# Patient Record
Sex: FEMALE | Race: OTHER | URBAN - METROPOLITAN AREA
[De-identification: names, ages, dates, MRNs, and addresses within clinical notes are randomized per-mention and may not be internally consistent; named-entity substitution may affect disease eponyms.]

---

## 2017-03-05 ENCOUNTER — EMERGENCY (EMERGENCY)
Facility: HOSPITAL | Age: 42
LOS: 1 days | Discharge: HOME | End: 2017-03-05
Attending: EMERGENCY MEDICINE | Admitting: EMERGENCY MEDICINE
Payer: COMMERCIAL

## 2017-03-05 VITALS
RESPIRATION RATE: 16 BRPM | HEART RATE: 91 BPM | DIASTOLIC BLOOD PRESSURE: 64 MMHG | TEMPERATURE: 98 F | SYSTOLIC BLOOD PRESSURE: 103 MMHG | OXYGEN SATURATION: 98 %

## 2017-03-05 DIAGNOSIS — M25.572 PAIN IN LEFT ANKLE AND JOINTS OF LEFT FOOT: ICD-10-CM

## 2017-03-05 PROCEDURE — 73630 X-RAY EXAM OF FOOT: CPT | Mod: 26,LT

## 2017-03-05 PROCEDURE — 99284 EMERGENCY DEPT VISIT MOD MDM: CPT | Mod: 25

## 2017-03-05 PROCEDURE — 29515 APPLICATION SHORT LEG SPLINT: CPT | Mod: LT

## 2017-03-05 PROCEDURE — 73630 X-RAY EXAM OF FOOT: CPT

## 2017-03-05 PROCEDURE — 73610 X-RAY EXAM OF ANKLE: CPT | Mod: 26,LT

## 2017-03-05 PROCEDURE — 73610 X-RAY EXAM OF ANKLE: CPT

## 2017-03-05 RX ORDER — IBUPROFEN 200 MG
600 TABLET ORAL ONCE
Qty: 0 | Refills: 0 | Status: COMPLETED | OUTPATIENT
Start: 2017-03-05 | End: 2017-03-05

## 2017-03-05 RX ADMIN — Medication 600 MILLIGRAM(S): at 20:51

## 2017-03-05 NOTE — ED ADULT NURSE NOTE - CHPI ED SYMPTOMS NEG
no deformity/no back pain/no abrasion/no fever/no tingling/no numbness/no bruising/no stiffness/no weakness

## 2017-03-05 NOTE — ED PROVIDER NOTE - PLAN OF CARE
Rest, increase activity as tolerated. Wear splint at all times. Do not get wet, do not remove, Use crutches to walk. Rest, ice packs to all sore areas. Take motrin for pain as needed- 600 mg every 8 hrs. Take with food. Follow up with orthopedist. [phone]- DR Chaparro

## 2017-03-05 NOTE — ED PROVIDER NOTE - CARE PLAN
Principal Discharge DX:	Closed fracture of left ankle, initial encounter  Instructions for follow-up, activity and diet:	Rest, increase activity as tolerated. Wear splint at all times. Do not get wet, do not remove, Use crutches to walk. Rest, ice packs to all sore areas. Take motrin for pain as needed- 600 mg every 8 hrs. Take with food. Follow up with orthopedist. [phone]- DR Chaparro Principal Discharge DX:	Closed fracture of left ankle, initial encounter  Instructions for follow-up, activity and diet:	Rest, increase activity as tolerated. Wear splint at all times. Do not get wet, do not remove, Use crutches to walk. Rest, ice packs to all sore areas. Take motrin for pain as needed- 600 mg every 8 hrs. Take with food. Follow up with orthopedist. [phone]- DR Chaparro Principal Discharge DX:	Closed fracture of left ankle, initial encounter  Instructions for follow-up, activity and diet:	Rest, increase activity as tolerated. Wear splint at all times. Do not get wet, do not remove, Use crutches to walk. Rest, ice packs to all sore areas. Take motrin for pain as needed- 600 mg every 8 hrs. Take with food. Follow up with orthopedist. [phone]- DR Chaparro

## 2017-03-05 NOTE — ED PROCEDURE NOTE - CPROC ED POST PROC CARE GUIDE1
Verbal/written post procedure instructions were given to patient/caregiver./Instructed patient/caregiver regarding signs and symptoms of infection./Elevate the injured extremity as instructed./Instructed patient/caregiver to follow-up with primary care physician./Keep the cast/splint/dressing clean and dry.

## 2017-03-05 NOTE — ED PROVIDER NOTE - MEDICAL DECISION MAKING DETAILS
s/p left ankle inversion injury- distal fib fx. skin intact- splinted. , f/u with crutches Keshav: 41 to F here s/p left ankle inversion injury- possible distal fib fx. skin intact but will recommend that pt see orthopedic for repeat xray in 1 week to determine whether it is a true fracture- splinted as pt has pain with ambulation for ligamentous injury, f/u with crutches Keshav: 41 to F here s/p left ankle inversion injury- sprain vs possible distal fib fx. skin intact but will recommend that pt see orthopedic for repeat xray in 1 week to determine whether it is a true fracture- splinted as pt has pain with ambulation for ligamentous injury, f/u with crutches

## 2017-03-05 NOTE — ED ADULT NURSE NOTE - OBJECTIVE STATEMENT
42 y/o female presents to the ED c/o left ankle injury s/p mechanical fall at home. Pt states she was walking and slipped on a plastic bag, twisted ankle. Difficulty bearing weight and ambulating s/p injury. Presents with left ankle swelling, no ecchymosis, no redness. Pt A&Ox3, +sensation, no numbness/tingling, cap refill<2, resting comfortably with safety maintained.

## 2017-03-05 NOTE — ED PROCEDURE NOTE - CPROC ED POST RADIOGRAPHY1
extremity correctly positioned, splinted/post-procedure radiography performed/post procedure radiography not performed

## 2017-03-05 NOTE — ED PROVIDER NOTE - OBJECTIVE STATEMENT
42 yo  female presents to the ER for evaluation of left lateral ankle pain. Pt states "I slipped and rolled my left ankle this evening about one hour ago". Pt with moderate lateral left ankle pain and swelling. SKin intact,  Unable to ambulate due to pain. No pain to leg or knee.  Pt reports pain at left lateral foot.

## 2021-02-11 PROBLEM — Z00.00 ENCOUNTER FOR PREVENTIVE HEALTH EXAMINATION: Status: ACTIVE | Noted: 2021-02-11

## 2021-02-22 ENCOUNTER — APPOINTMENT (OUTPATIENT)
Dept: GASTROENTEROLOGY | Facility: CLINIC | Age: 46
End: 2021-02-22
Payer: COMMERCIAL

## 2021-02-22 VITALS
DIASTOLIC BLOOD PRESSURE: 78 MMHG | SYSTOLIC BLOOD PRESSURE: 110 MMHG | HEIGHT: 64 IN | BODY MASS INDEX: 28.68 KG/M2 | WEIGHT: 168 LBS

## 2021-02-22 DIAGNOSIS — Z80.0 FAMILY HISTORY OF MALIGNANT NEOPLASM OF DIGESTIVE ORGANS: ICD-10-CM

## 2021-02-22 DIAGNOSIS — U07.1 COVID-19: ICD-10-CM

## 2021-02-22 PROCEDURE — 99072 ADDL SUPL MATRL&STAF TM PHE: CPT

## 2021-02-22 PROCEDURE — 99244 OFF/OP CNSLTJ NEW/EST MOD 40: CPT

## 2021-02-22 RX ORDER — METHYLPREDNISOLONE 4 MG/1
4 TABLET ORAL
Qty: 21 | Refills: 0 | Status: DISCONTINUED | COMMUNITY
Start: 2020-09-03

## 2021-02-22 RX ORDER — CLONAZEPAM 0.5 MG/1
0.5 TABLET ORAL
Qty: 7 | Refills: 0 | Status: DISCONTINUED | COMMUNITY
Start: 2021-01-07

## 2021-02-22 RX ORDER — GABAPENTIN 300 MG/1
300 CAPSULE ORAL
Qty: 30 | Refills: 0 | Status: DISCONTINUED | COMMUNITY
Start: 2020-12-11

## 2021-04-13 ENCOUNTER — APPOINTMENT (OUTPATIENT)
Dept: GASTROENTEROLOGY | Facility: CLINIC | Age: 46
End: 2021-04-13

## 2021-05-24 ENCOUNTER — APPOINTMENT (OUTPATIENT)
Dept: CARDIOLOGY | Facility: CLINIC | Age: 46
End: 2021-05-24
Payer: COMMERCIAL

## 2021-05-24 ENCOUNTER — NON-APPOINTMENT (OUTPATIENT)
Age: 46
End: 2021-05-24

## 2021-05-24 VITALS
HEART RATE: 95 BPM | SYSTOLIC BLOOD PRESSURE: 104 MMHG | WEIGHT: 172 LBS | OXYGEN SATURATION: 98 % | BODY MASS INDEX: 29.52 KG/M2 | DIASTOLIC BLOOD PRESSURE: 73 MMHG

## 2021-05-24 PROCEDURE — 99205 OFFICE O/P NEW HI 60 MIN: CPT

## 2021-05-24 PROCEDURE — 93000 ELECTROCARDIOGRAM COMPLETE: CPT

## 2021-05-25 ENCOUNTER — NON-APPOINTMENT (OUTPATIENT)
Age: 46
End: 2021-05-25

## 2021-05-25 NOTE — CONSULT LETTER
[Dear  ___] : Dear  [unfilled], [Consult Letter:] : I had the pleasure of evaluating your patient, [unfilled]. [( Thank you for referring [unfilled] for consultation for _____ )] : Thank you for referring [unfilled] for consultation for [unfilled] [Please see my note below.] : Please see my note below. [Consult Closing:] : Thank you very much for allowing me to participate in the care of this patient.  If you have any questions, please do not hesitate to contact me. [Sincerely,] : Sincerely, [FreeTextEntry3] : August Castorena MD, FACP, AGAF, FAASLD\par  of Medicine\par [facility]\par

## 2021-05-25 NOTE — ASSESSMENT
[FreeTextEntry1] : 45-year-old female with epigastric pain history of Covid she experienced.  She experiences occasional palpitations for which she request cardiology evaluation.  She would like to wait for her endoscopies till she is seen by a cardiologist.  She already has an appointment and will make a future appointment.

## 2021-05-25 NOTE — PHYSICAL EXAM
[General Appearance - Alert] : alert [Sclera] : the sclera and conjunctiva were normal [Outer Ear] : the ears and nose were normal in appearance [Neck Appearance] : the appearance of the neck was normal [] : no respiratory distress [Exaggerated Use Of Accessory Muscles For Inspiration] : no accessory muscle use [Apical Impulse] : the apical impulse was normal [Bowel Sounds] : normal bowel sounds [Cranial Nerves] : cranial nerves 2-12 were intact [Oriented To Time, Place, And Person] : oriented to person, place, and time [FreeTextEntry1] : mild epigastric tenderness

## 2021-05-25 NOTE — HISTORY OF PRESENT ILLNESS
[FreeTextEntry1] : 44 yo female, with epigastric pain x 3months. Complains of nausea, pain worsened by ETOH. Uses asa prn.  She states she is recovered from Covid in the spring and would like to visit a cardiologist because of her palpitations prior to scheduling an endoscopy.  She has occasional constipation for which she uses [employer] milk of magnesia.

## 2021-05-25 NOTE — REVIEW OF SYSTEMS
[Abdominal Pain] : abdominal pain [Arthralgias] : arthralgias [Negative] : Endocrine [FreeTextEntry5] : palpitations

## 2022-01-25 ENCOUNTER — NON-APPOINTMENT (OUTPATIENT)
Age: 47
End: 2022-01-25

## 2022-01-25 ENCOUNTER — APPOINTMENT (OUTPATIENT)
Dept: CARDIOLOGY | Facility: CLINIC | Age: 47
End: 2022-01-25
Payer: COMMERCIAL

## 2022-01-25 VITALS
HEIGHT: 64 IN | BODY MASS INDEX: 28.51 KG/M2 | WEIGHT: 167 LBS | OXYGEN SATURATION: 97 % | RESPIRATION RATE: 17 BRPM | SYSTOLIC BLOOD PRESSURE: 110 MMHG | DIASTOLIC BLOOD PRESSURE: 76 MMHG | HEART RATE: 87 BPM

## 2022-01-25 PROCEDURE — 99214 OFFICE O/P EST MOD 30 MIN: CPT

## 2022-01-25 RX ORDER — OXYCODONE AND ACETAMINOPHEN 5; 325 MG/1; MG/1
5-325 TABLET ORAL
Qty: 25 | Refills: 0 | Status: COMPLETED | COMMUNITY
Start: 2021-08-12

## 2022-01-25 RX ORDER — CEPHALEXIN 500 MG/1
500 CAPSULE ORAL
Qty: 4 | Refills: 0 | Status: COMPLETED | COMMUNITY
Start: 2021-07-26

## 2022-01-25 RX ORDER — TRIAMCINOLONE ACETONIDE 5 MG/G
0.5 CREAM TOPICAL
Qty: 15 | Refills: 0 | Status: COMPLETED | COMMUNITY
Start: 2021-10-09

## 2022-01-25 RX ORDER — CELECOXIB 200 MG/1
200 CAPSULE ORAL
Qty: 60 | Refills: 0 | Status: COMPLETED | COMMUNITY
Start: 2021-09-08

## 2022-01-25 RX ORDER — KETOCONAZOLE 20.5 MG/ML
2 SHAMPOO, SUSPENSION TOPICAL
Qty: 240 | Refills: 0 | Status: COMPLETED | COMMUNITY
Start: 2021-11-19

## 2022-01-25 RX ORDER — ETODOLAC 500 MG/1
500 TABLET, FILM COATED ORAL
Qty: 60 | Refills: 0 | Status: COMPLETED | COMMUNITY
Start: 2021-12-01

## 2022-01-25 RX ORDER — HALOBETASOL PROPIONATE 0.5 MG/G
0.05 OINTMENT TOPICAL
Qty: 50 | Refills: 0 | Status: COMPLETED | COMMUNITY
Start: 2021-11-18

## 2022-01-25 RX ORDER — AMOXICILLIN 500 MG/1
500 CAPSULE ORAL
Qty: 21 | Refills: 0 | Status: COMPLETED | COMMUNITY
Start: 2022-01-17

## 2022-04-12 ENCOUNTER — APPOINTMENT (OUTPATIENT)
Dept: CARDIOLOGY | Facility: CLINIC | Age: 47
End: 2022-04-12
Payer: COMMERCIAL

## 2022-04-12 VITALS
HEART RATE: 82 BPM | HEIGHT: 64 IN | RESPIRATION RATE: 17 BRPM | WEIGHT: 162 LBS | BODY MASS INDEX: 27.66 KG/M2 | SYSTOLIC BLOOD PRESSURE: 113 MMHG | OXYGEN SATURATION: 99 % | DIASTOLIC BLOOD PRESSURE: 75 MMHG

## 2022-04-12 PROCEDURE — 99214 OFFICE O/P EST MOD 30 MIN: CPT

## 2022-04-14 ENCOUNTER — RX RENEWAL (OUTPATIENT)
Age: 47
End: 2022-04-14

## 2022-09-09 ENCOUNTER — APPOINTMENT (OUTPATIENT)
Dept: CARDIOLOGY | Facility: CLINIC | Age: 47
End: 2022-09-09

## 2023-01-28 ENCOUNTER — RX RENEWAL (OUTPATIENT)
Age: 48
End: 2023-01-28

## 2023-02-10 ENCOUNTER — NON-APPOINTMENT (OUTPATIENT)
Age: 48
End: 2023-02-10

## 2023-02-10 ENCOUNTER — APPOINTMENT (OUTPATIENT)
Dept: CARDIOLOGY | Facility: CLINIC | Age: 48
End: 2023-02-10
Payer: COMMERCIAL

## 2023-02-10 VITALS
HEIGHT: 64 IN | HEART RATE: 104 BPM | OXYGEN SATURATION: 95 % | SYSTOLIC BLOOD PRESSURE: 116 MMHG | BODY MASS INDEX: 28.68 KG/M2 | DIASTOLIC BLOOD PRESSURE: 76 MMHG | WEIGHT: 168 LBS

## 2023-02-10 PROCEDURE — 99214 OFFICE O/P EST MOD 30 MIN: CPT

## 2023-06-27 ENCOUNTER — APPOINTMENT (OUTPATIENT)
Dept: GASTROENTEROLOGY | Facility: CLINIC | Age: 48
End: 2023-06-27

## 2023-11-15 ENCOUNTER — APPOINTMENT (OUTPATIENT)
Dept: GASTROENTEROLOGY | Facility: CLINIC | Age: 48
End: 2023-11-15
Payer: COMMERCIAL

## 2023-11-15 VITALS
RESPIRATION RATE: 16 BRPM | BODY MASS INDEX: 27.31 KG/M2 | HEART RATE: 105 BPM | HEIGHT: 64 IN | WEIGHT: 160 LBS | OXYGEN SATURATION: 98 % | DIASTOLIC BLOOD PRESSURE: 70 MMHG | TEMPERATURE: 98.6 F | SYSTOLIC BLOOD PRESSURE: 112 MMHG

## 2023-11-15 DIAGNOSIS — Z12.11 ENCOUNTER FOR SCREENING FOR MALIGNANT NEOPLASM OF COLON: ICD-10-CM

## 2023-11-15 PROCEDURE — 99213 OFFICE O/P EST LOW 20 MIN: CPT

## 2023-11-15 RX ORDER — OMEPRAZOLE 40 MG/1
40 CAPSULE, DELAYED RELEASE ORAL
Qty: 90 | Refills: 2 | Status: ACTIVE | COMMUNITY
Start: 2021-02-22 | End: 1900-01-01

## 2024-01-08 ENCOUNTER — NON-APPOINTMENT (OUTPATIENT)
Age: 49
End: 2024-01-08

## 2024-01-08 ENCOUNTER — APPOINTMENT (OUTPATIENT)
Dept: CARDIOLOGY | Facility: CLINIC | Age: 49
End: 2024-01-08
Payer: COMMERCIAL

## 2024-01-08 VITALS
DIASTOLIC BLOOD PRESSURE: 80 MMHG | WEIGHT: 153 LBS | OXYGEN SATURATION: 98 % | HEART RATE: 91 BPM | BODY MASS INDEX: 26.12 KG/M2 | SYSTOLIC BLOOD PRESSURE: 120 MMHG | HEIGHT: 64 IN

## 2024-01-08 DIAGNOSIS — G44.52 NEW DAILY PERSISTENT HEADACHE (NDPH): ICD-10-CM

## 2024-01-08 PROCEDURE — 99214 OFFICE O/P EST MOD 30 MIN: CPT | Mod: 25

## 2024-01-08 PROCEDURE — 93000 ELECTROCARDIOGRAM COMPLETE: CPT

## 2024-01-08 RX ORDER — METOPROLOL SUCCINATE 50 MG/1
50 TABLET, EXTENDED RELEASE ORAL DAILY
Qty: 90 | Refills: 3 | Status: ACTIVE | COMMUNITY
Start: 2024-01-08 | End: 1900-01-01

## 2024-01-08 RX ORDER — TIZANIDINE HYDROCHLORIDE 2 MG/1
2 CAPSULE ORAL
Refills: 0 | Status: ACTIVE | COMMUNITY

## 2024-01-08 NOTE — PHYSICAL EXAM
[TextEntry] : General/Constitutional: WD/ WN in NAD H: NC/AT Eyes:  PERRL, sclerae and conjunctivae normal without jaundice or xanthelasma; ENMT:normal teeth, gums and palate with no petechiae, pallor or cyanosis Neck: w/o JVD, thyromegaly or adenopathy; normal venous contour Respiratory: clear to auscultation, normal respiratory effort with no retractions or use of accessory respiratory muscles Heart: [id number], regular rate, normal S1, S2 without murmurs, rubs, gallops, heaves or thrills Vascular exam: normal carotid upstrokes without carotid or abdominal bruits. 2+/2+ pulses to posterior tibialis and dorsalis pedis Abdomen: soft, nontender, bowels sounds normal without hepatomegaly or splenomegaly, masses or bruits Musculoskeletal:without significant kyphosis or scoliosis Extremities: w/o CCE, good capillary filling Skin: no stasis changes; no ulcers  Neuro: AA and O x 3; no focal neurologic deficits Psych: normal mood and affect

## 2024-01-08 NOTE — REASON FOR VISIT
[FreeTextEntry1] : The patient has ongoing difficulties with palpitations, atypical chest discomfort and several weeks of almost daily headaches.  The patient exercises regularly and denies any symptoms when she is exercising.  However at other times, she can feel palpitations and very brief (seconds) chest discomfort which occurs without clear provocation and resolve spontaneously.  It never occurs with exercise.  She also describes several weeks of mild diffuse headaches as well as some dizziness.  The patient has chronic relative tachycardia.  She has not had blood test done in the last 6 months.

## 2024-01-08 NOTE — REVIEW OF SYSTEMS
[TextEntry] : Except as noted above... Constitutional: The patient denied  fatigue, fever, sweats, loss of appetite or chills Eyes: The patient denied double vision, eye pain, eye discharge, red eyes or itchy eyes ENT: The patient denied ear pain, ear discharge, nasal congestion, nasal discharge, sore throat, enlarged tonsils, hoarseness, neck pain or neck swelling Cardiovascular: The patient denied chest pain, chest discomfort, dizziness, fainting  or leg cramps Respiratory: The patient denied shortness of breath, cough, coughing up blood, wheezing, chest congestion or mucous production GI: The patient denied  abdominal pain, nausea, vomiting, diarrhea, constipation, black stools or bloody stools : The patient denied pain on urination, burning with urination, frequent urination or blood in the urine Skin: The patient denied rashes, redness or swelling Neurologic: The patient denied  stiff neck, weakness, numbness, difficulty speaking, unsteadiness or numbness/tingling in feet Psychiatric: The patient denied hallucinations, agitation or disorientation Endocrine: The patient denied excessive thirst, excessive urination, cold intolerance or heat intolerance Hematologic: The patient denied easy bruisability or pallor Allergic/Immunologic: The patient denied runny nose, recurrent infections, hives or pruritis Musculoskeletal: The patient denied arthritic pains, muscle weakness or muscle aches Extremities: The patient denied clubbing, cyanosis or lower extremity swelling

## 2024-01-08 NOTE — DISCUSSION/SUMMARY
[FreeTextEntry1] : Palpitations, atypical chest discomfort and dizziness-laboratory data will be drawn to exclude anemia, thyroid disease etc. as possible contributing causes.  The patient had an echocardiogram done several years ago which was entirely within normal limits.  At this point.  Would like to try an empiric trial of metoprolol 25 to 50 mg once daily to see whether symptoms (possibly including headache) improve.  Further recommendations pending results of blood tests and response to low-dose metoprolol. Return visit as needed.   Total time of the encounter: 30 minutes which included but was not limited to the following: Face-to-face and non face-to-face time personally spent by the physician preparing to see the patient, obtaining and/or resuming separately obtained history, performing a medically appropriate examination and/or evaluation, counseling and educating the patient/family/caregiver, ordering medications, tests or procedures, referring and communicating with other healthcare professionals, documenting clinical information in the electronic health record, independently interpreting results and communicated results to the patient/family/caregiver and care coordination. [EKG obtained to assist in diagnosis and management of assessed problem(s)] : EKG obtained to assist in diagnosis and management of assessed problem(s)

## 2024-01-17 ENCOUNTER — APPOINTMENT (OUTPATIENT)
Dept: GASTROENTEROLOGY | Facility: AMBULATORY SURGERY CENTER | Age: 49
End: 2024-01-17
Payer: COMMERCIAL

## 2024-01-17 ENCOUNTER — RESULT REVIEW (OUTPATIENT)
Age: 49
End: 2024-01-17

## 2024-01-17 PROCEDURE — 43239 EGD BIOPSY SINGLE/MULTIPLE: CPT

## 2024-01-17 PROCEDURE — 45378 DIAGNOSTIC COLONOSCOPY: CPT

## 2024-03-08 ENCOUNTER — APPOINTMENT (OUTPATIENT)
Dept: GASTROENTEROLOGY | Facility: CLINIC | Age: 49
End: 2024-03-08
Payer: COMMERCIAL

## 2024-03-08 VITALS
HEIGHT: 64 IN | BODY MASS INDEX: 26.98 KG/M2 | HEART RATE: 90 BPM | OXYGEN SATURATION: 99 % | DIASTOLIC BLOOD PRESSURE: 80 MMHG | SYSTOLIC BLOOD PRESSURE: 120 MMHG | WEIGHT: 158 LBS | TEMPERATURE: 98 F | RESPIRATION RATE: 14 BRPM

## 2024-03-08 DIAGNOSIS — R10.13 EPIGASTRIC PAIN: ICD-10-CM

## 2024-03-08 PROCEDURE — 99214 OFFICE O/P EST MOD 30 MIN: CPT

## 2024-03-08 RX ORDER — POLYETHYLENE GLYCOL-3350 AND ELECTROLYTES 236; 6.74; 5.86; 2.97; 22.74 G/274.31G; G/274.31G; G/274.31G; G/274.31G; G/274.31G
236 POWDER, FOR SOLUTION ORAL
Qty: 4000 | Refills: 0 | Status: DISCONTINUED | COMMUNITY
Start: 2024-01-10 | End: 2024-03-08

## 2024-03-08 RX ORDER — SODIUM PICOSULFATE, MAGNESIUM OXIDE, AND ANHYDROUS CITRIC ACID 12; 3.5; 1 G/175ML; G/175ML; MG/175ML
10-3.5-12 MG-GM LIQUID ORAL TWICE DAILY
Qty: 2 | Refills: 0 | Status: DISCONTINUED | COMMUNITY
Start: 2023-11-15 | End: 2024-03-08

## 2024-03-08 NOTE — PHYSICAL EXAM
[Alert] : alert [Normal Voice/Communication] : normal voice/communication [No Acute Distress] : no acute distress [Healthy Appearing] : healthy appearing [Sclera] : the sclera and conjunctiva were normal [Hearing Threshold Finger Rub Not Ness] : hearing was normal [Normal Lips/Gums] : the lips and gums were normal [Oropharynx] : the oropharynx was normal [Normal Appearance] : the appearance of the neck was normal [No Neck Mass] : no neck mass was observed [No Respiratory Distress] : no respiratory distress [No Acc Muscle Use] : no accessory muscle use [Auscultation Breath Sounds / Voice Sounds] : lungs were clear to auscultation bilaterally [Respiration, Rhythm And Depth] : normal respiratory rhythm and effort [Heart Rate And Rhythm] : heart rate was normal and rhythm regular [Normal S1, S2] : normal S1 and S2 [Bowel Sounds] : normal bowel sounds [Murmurs] : no murmurs [Abdomen Tenderness] : non-tender [No Masses] : no abdominal mass palpated [Abdomen Soft] : soft [] : no hepatosplenomegaly [Oriented To Time, Place, And Person] : oriented to person, place, and time

## 2024-03-08 NOTE — ASSESSMENT
[FreeTextEntry1] : 48-year-old female originally from [facility] with epigastric discomfort atypical chest pain and due for initial colorectal cancer screening.  She has regular bowel movements.  There is no nausea or vomiting.  There is no daily NSAID usage and she has no  weight loss. THere is no family hx of colon cancer.  RTO today 3/9/24- complains of weight gain of 5 lbs. Reviewed egd and colon from 2024 january both normal. Has occ gerrd, using omeprazole prn trying to use life-style changes. Complains of bloating.  IMP: 1. gerd 2. bloating  PLAN: 1. alife style changes for gerd, occ ppi 2. Simethicone trial. prn followup

## 2024-03-08 NOTE — REASON FOR VISIT
[Follow-up] : a follow-up of an existing diagnosis [FreeTextEntry1] : weight gain, palpitations , gerd

## 2024-05-23 ENCOUNTER — APPOINTMENT (OUTPATIENT)
Dept: ENDOCRINOLOGY | Facility: CLINIC | Age: 49
End: 2024-05-23
Payer: COMMERCIAL

## 2024-05-23 VITALS
DIASTOLIC BLOOD PRESSURE: 80 MMHG | HEIGHT: 64 IN | TEMPERATURE: 98.4 F | SYSTOLIC BLOOD PRESSURE: 122 MMHG | HEART RATE: 88 BPM | BODY MASS INDEX: 26.98 KG/M2 | WEIGHT: 158 LBS | OXYGEN SATURATION: 98 %

## 2024-05-23 PROCEDURE — 99204 OFFICE O/P NEW MOD 45 MIN: CPT

## 2024-05-23 NOTE — HISTORY OF PRESENT ILLNESS
[FreeTextEntry1] : 49 year F here for assessment of weight gain  Follows a special diet: None, reports eats less Food Cravings: No  Tried to lose weight before: Yes   Tried any diet plans/ meal replacements for weight control: No Tried OTC or prescription medication for weight control:  given phentermine by another doctor Activity level: typically inactive with no regular physical activity due to reported "knee issues"  Ease of skin bruising: No Proximal muscle weakness: No Prior weight loss surgery: No     History of the following:   Thyroid disease: No Heart disease: No Mood disorder: No Hypertension: No Seizures: No Gall bladder disease: No Known diabetic retinopathy: No  Pancreatitis: No Organ transplant: No

## 2024-05-24 LAB
25(OH)D3 SERPL-MCNC: 28.9 NG/ML
ALBUMIN SERPL ELPH-MCNC: 4.3 G/DL
ALP BLD-CCNC: 86 U/L
ALT SERPL-CCNC: 13 U/L
AST SERPL-CCNC: 14 U/L
BILIRUB DIRECT SERPL-MCNC: 0.1 MG/DL
BILIRUB INDIRECT SERPL-MCNC: 0.3 MG/DL
BILIRUB SERPL-MCNC: 0.4 MG/DL
CALCIUM SERPL-MCNC: 9.5 MG/DL
CORTIS SERPL-MCNC: 6.7 UG/DL
CREAT SERPL-MCNC: 0.65 MG/DL
EGFR: 107 ML/MIN/1.73M2
ESTIMATED AVERAGE GLUCOSE: 108 MG/DL
HBA1C MFR BLD HPLC: 5.4 %
PROT SERPL-MCNC: 6.9 G/DL
T4 FREE SERPL-MCNC: 1.3 NG/DL
TSH SERPL-ACNC: 0.71 UIU/ML

## 2024-05-30 LAB — ACTH-ESO: 11 PG/ML

## 2024-06-05 PROBLEM — R42 DIZZINESS: Status: ACTIVE | Noted: 2024-01-08

## 2024-06-06 ENCOUNTER — APPOINTMENT (OUTPATIENT)
Dept: CARDIOLOGY | Facility: CLINIC | Age: 49
End: 2024-06-06
Payer: COMMERCIAL

## 2024-06-06 VITALS
SYSTOLIC BLOOD PRESSURE: 104 MMHG | OXYGEN SATURATION: 97 % | HEART RATE: 89 BPM | DIASTOLIC BLOOD PRESSURE: 68 MMHG | WEIGHT: 158 LBS | BODY MASS INDEX: 26.98 KG/M2 | HEIGHT: 64 IN

## 2024-06-06 DIAGNOSIS — R42 DIZZINESS AND GIDDINESS: ICD-10-CM

## 2024-06-06 PROCEDURE — 99204 OFFICE O/P NEW MOD 45 MIN: CPT | Mod: 25

## 2024-06-06 PROCEDURE — 99214 OFFICE O/P EST MOD 30 MIN: CPT | Mod: 25

## 2024-06-06 PROCEDURE — G2211 COMPLEX E/M VISIT ADD ON: CPT | Mod: NC

## 2024-06-06 PROCEDURE — 99402 PREV MED CNSL INDIV APPRX 30: CPT

## 2024-06-07 ENCOUNTER — APPOINTMENT (OUTPATIENT)
Dept: ORTHOPEDIC SURGERY | Facility: CLINIC | Age: 49
End: 2024-06-07
Payer: COMMERCIAL

## 2024-06-07 VITALS — HEIGHT: 65 IN | BODY MASS INDEX: 25.99 KG/M2 | WEIGHT: 156 LBS

## 2024-06-07 DIAGNOSIS — M25.572 PAIN IN LEFT ANKLE AND JOINTS OF LEFT FOOT: ICD-10-CM

## 2024-06-07 DIAGNOSIS — G89.29 PAIN IN LEFT ANKLE AND JOINTS OF LEFT FOOT: ICD-10-CM

## 2024-06-07 PROCEDURE — 99204 OFFICE O/P NEW MOD 45 MIN: CPT

## 2024-06-07 NOTE — ADDENDUM
[FreeTextEntry1] : I, Iron Benjamin, acted solely as a scribe for Dr. Rik Fitzpatrick on this date 06/07/2024.   All medical record entries made by the Scribe were at my, Dr. Rik Fitzpatrick, direction and personally dictated by me on 06/07/2024 . I have reviewed the chart and agree that the record accurately reflects my personal performance of the history, physical exam, assessment and plan. I have also personally directed, reviewed, and agreed with the chart.

## 2024-06-07 NOTE — HISTORY OF PRESENT ILLNESS
[FreeTextEntry1] : 06/07/2024. The patient is a 49 year old female presenting for an initial visit of left ankle pain. She is here seeking a second opinion. The patient was referred by Dr. Jin Villasenor. Before the 1st surgery, she endorsed pain on the anterior aspect of her ankle and the plantar aspect of her heel. She received an MRI in June 2021 and she was diagnosed with a cyst. She has received 2 surgeries for her left ankle. The first surgery was ankle reconstruction while the second surgery was cyst removal. After the first surgery, she noted more global swelling and pain. After the second surgery, the pain has improved, but the pain and swelling are still in the same regions of the anterior aspect of her ankle and by her heel. Nonetheless, the pain where her cyst was had improved significantly. The patient also now reports numbness in the lateral aspect of her foot and toes, especially in the morning. She received PRP for 4-5 times with minimal relief. She also received 2 steroid taper packs with temporary relief. She is allergic to meloxicam. The patient received a cortisone injection month before seeing Dr. Villasenor, but the pain The patient presents to the office in sneakers and ambulating without assistance.

## 2024-06-07 NOTE — DISCUSSION/SUMMARY
[de-identified] : Today I had a lengthy discussion with the patient regarding their left ankle pain. I have addressed all the patient's concerns surrounding the pathology of their condition. I have reviewed the patient's MRI results with them in great detail. Postsurgical changes in the talus. 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 Mild scarring/thickening at the origin of the medial cord of the plantar fascia, without discrete tears. Cortisone injection vs conservative management was discussed in great detail. At this time, I do not recommend surgery. The patient is receiving another PRP from Dr. Villasenor. The patient is also allergic to Meloxicam.     Plan:  1. I recommend that the pt utilize an OTC ankle sleeve. 2. I recommend that the patient utilize ice, NSAIDS PRN, and heat. They can also elevate their LLE above the level of the heart. 3. A discussion was had about shoe-wear modifications. I advised the patient to utilize a wide toed cross training sneaker that better accommodates the feet. I recommended New Balance, Mendoza, [employer], or Hoka to the patient.  f/u prn   The patient understood and verbally agreed to the treatment plan. All of their questions were answered, and they were satisfied with the visit. The patient should call the office if they have any questions or experience worsening symptoms.

## 2024-06-07 NOTE — PHYSICAL EXAM
[de-identified] : Left ankle Physical Examination:  General: Alert and oriented x3.  In no acute distress.  Pleasant in nature with a normal affect.  No apparent respiratory distress.  Erythema, Warmth, Rubor: Negative Swelling: Negative  +anterior arthroscopy incisions.  +sinus lissette tenderness +no pain to the distal fibula +red and hot foot  ROM: 1. Dorsiflexion: 10 degrees 2. Plantarflexion: 40 degrees 3. Inversion: 30 degrees 4. Eversion: 20 degrees  Tenderness to Palpation:  1. Lateral Malleolus: Negative 2. Medial Malleolus: Negative 3. Proximal Fibular Pain: Negative 4. Heel Pain: Negative 5. Cuboid: Negative 6. Navicular: Negative 7. Tibiotalar Joint: Negative 8. Subtalar Joint: Negative 9. Posterior Recess: Negative  Tendon Pain: 1. Achilles: Negative 2. Peroneals: Negative 3. Posterior Tibialis: Negative 4. Tibialis Anterior: Negative  Ligament Pain: 1. ATFL: Negative 2. CFL: Negative  3. PTFL: Negative 4. Deltoid Ligaments: Negative 5. Lis Franc Ligament: Negative  Stability:  1. Anterior Drawer: Negative 2. Posterior Drawer: Negative  Strength: 5/5 TA/GS/EHL  Pulses: 2+ DP/PT Pulses  Neuro: Intact motor and sensory  Additional Test: 1. Calcaneal Squeeze Test: Negative 2. Syndesmosis Squeeze Test: Negative [de-identified] : MRI-LEFT ANKLE NON CONTRAST  HISTORY: M25.572 Left ankle pain M79.672 Left foot pain R20.0 Numbness Lower/Upper Extremity  TECHNIQUE: MR Imaging of the left ankle, midfoot, and hindfoot/heel.  COMPARISON: No prior studies are available for direct comparison at the time of this interpretation.  FINDINGS: Postsurgical changes are present in the talus.  Ligaments: The anterior and posterior distal tibiofibular ligaments are intact. The anterior talofibular, posterior talofibular, calcaneofibular, and deltoid ligaments are unremarkable.  Tendons: The flexor and extensor tendons of the ankle are unremarkable.  Bones: There is no evidence for osteochondral lesions at the tibiotalar joint. No tarsal coalition is present. The bone marrow signal is overall age appropriate.  Sinus Tarsi: Edema is present in the sinus tarsi, without evidence for ligament tears.  Plantar Fascia: Mild scarring/thickening is present at the origin of the medial cord of the plantar fascia, without discrete tears. A small plantar calcaneal enthesophyte is present.  There is scarring and high-grade tearing of the Lisfranc ligament. There is no evidence for compression on the anterior or posterior tarsal tunnels.  IMPRESSION:   Postsurgical changes in the talus. 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  Edema in the sinus tarsi, without evidence for ligament tears.  Mild scarring/thickening at the origin of the medial cord of the plantar fascia, without discrete tears. Small plantar calcaneal enthesophyte.  Signed by: Felix Keith MD Signed Date: 4/5/2024 12:15 PM EDT    SIGNED BY: Felix Keith M.D., Ext. [phone] 04/05/2024 12:15 PM  ADDENDUM: Please note that there appears to be minimal osteolysis adjacent to the suture anchor in the tibia (series 6001 image 20).  Signed by: Felix Keith MD Signed Date: 5/1/2024 10:10 AM EDT

## 2024-06-18 ENCOUNTER — NON-APPOINTMENT (OUTPATIENT)
Age: 49
End: 2024-06-18

## 2024-06-25 ENCOUNTER — NON-APPOINTMENT (OUTPATIENT)
Age: 49
End: 2024-06-25

## 2024-06-26 ENCOUNTER — APPOINTMENT (OUTPATIENT)
Dept: CARDIOLOGY | Facility: CLINIC | Age: 49
End: 2024-06-26

## 2024-06-26 ENCOUNTER — NON-APPOINTMENT (OUTPATIENT)
Age: 49
End: 2024-06-26

## 2024-06-26 ENCOUNTER — APPOINTMENT (OUTPATIENT)
Dept: CARDIOLOGY | Facility: CLINIC | Age: 49
End: 2024-06-26
Payer: COMMERCIAL

## 2024-06-26 VITALS
SYSTOLIC BLOOD PRESSURE: 108 MMHG | RESPIRATION RATE: 17 BRPM | WEIGHT: 161 LBS | OXYGEN SATURATION: 98 % | HEART RATE: 82 BPM | HEIGHT: 65 IN | BODY MASS INDEX: 26.82 KG/M2 | DIASTOLIC BLOOD PRESSURE: 75 MMHG

## 2024-06-26 DIAGNOSIS — R00.2 PALPITATIONS: ICD-10-CM

## 2024-06-26 DIAGNOSIS — R63.5 ABNORMAL WEIGHT GAIN: ICD-10-CM

## 2024-06-26 DIAGNOSIS — R07.89 OTHER CHEST PAIN: ICD-10-CM

## 2024-06-26 PROCEDURE — G2211 COMPLEX E/M VISIT ADD ON: CPT | Mod: NC

## 2024-06-26 PROCEDURE — 93000 ELECTROCARDIOGRAM COMPLETE: CPT

## 2024-06-26 PROCEDURE — 99214 OFFICE O/P EST MOD 30 MIN: CPT | Mod: 25

## 2024-06-26 RX ORDER — PHENTERMINE HYDROCHLORIDE 37.5 MG/1
37.5 TABLET ORAL
Qty: 30 | Refills: 0 | Status: ACTIVE | COMMUNITY
Start: 2021-06-18 | End: 1900-01-01

## 2024-07-13 ENCOUNTER — APPOINTMENT (OUTPATIENT)
Dept: CARDIOLOGY | Facility: CLINIC | Age: 49
End: 2024-07-13

## 2024-08-22 ENCOUNTER — APPOINTMENT (OUTPATIENT)
Dept: CARDIOLOGY | Facility: CLINIC | Age: 49
End: 2024-08-22
Payer: COMMERCIAL

## 2024-08-22 PROCEDURE — 93306 TTE W/DOPPLER COMPLETE: CPT

## 2024-12-10 NOTE — ED PROCEDURE NOTE - CPROC ED ANATOMIC LOCATION1
[facility] Medicine  Progress note    Team: [facility] MED A Maryanne Camargo MD  Admit Date: 12/6/2024  Code status: Full Code    Principal Problem:  Hypoglycemia    Interval hx: Underwent EGD that esophagitis dessicans. Patient complains of abdominal pain. She had 56 glucose while in PACU and was given cranberry juice.     PEx  Temp:  [97.1 °F (36.2 °C)-98 °F (36.7 °C)]   Pulse:  [51-88]   Resp:  [13-26]   BP: ()/(57-80)   SpO2:  [94 %-100 %]     Intake/Output Summary (Last 24 hours) at 12/10/2024 1411  Last data filed at 12/10/2024 1130  Gross per 24 hour   Intake 600 ml   Output --   Net 600 ml     General Appearance: no acute distress, WD, ill-appearing  Heart: regular rate and rhythm, no heave  Respiratory: Normal respiratory effort, symmetric excursion, bilateral vesicular breath sounds   Abdomen: Soft, bowel sounds active; patient guarded with her hand before I could stethoscope on  Skin: intact, no rash, no ulcers  Neurologic:  No focal numbness or weakness  Mental status: Alert, oriented x 4, affect appropriate    Recent Labs   Lab 12/08/24  0332 12/09/24  0355 12/09/24  1531   WBC 2.18* 3.64* 4.68   HGB 10.9* 11.7* 11.7*   HCT 33.4* 37.1 38.1    255 238     Recent Labs   Lab 12/06/24  1635 12/07/24  0337 12/08/24  0332 12/09/24  0355 12/09/24  1531 12/10/24  0344    139 139 139 141 142   K 3.8 4.9 3.9 3.8 3.8 3.6   * 116* 113* 112* 112* 113*   CO2 19* 16* 22* 20* 23 22*   BUN 13 11 6 5* 5* 6   CREATININE 0.8 0.8 0.8 0.8 0.8 0.8   GLU 77 74 71 87 71 79   CALCIUM 9.0 7.9* 8.2* 8.7 8.5* 8.2*   MG 2.3 2.0 2.0 2.1  --   --    PHOS  --  4.3 3.7 4.2  --   --    LIPASE 11 13  --   --   --   --      Recent Labs   Lab 12/09/24  0355 12/09/24  1531 12/10/24  0344   ALKPHOS 243* 243* 228*   * 146* 157*   * 81* 108*   ALBUMIN 2.9* 3.0* 2.6*   PROT 5.7* 6.0 5.2*   BILITOT 0.2 0.2 0.2        Recent Labs   Lab 12/09/24  2351 12/10/24  0531 12/10/24  0717 12/10/24  0958 12/10/24  1020  12/10/24  1124   POCTGLUCOSE 117* 93 83 55* 66* 71       Scheduled Meds:   acetaminophen  650 mg Oral TID    azelastine  1 spray Nasal BID    cetirizine  10 mg Oral Daily    divalproex ER  250 mg Oral Daily    enoxparin  40 mg Subcutaneous Q24H (prophylaxis, 1700)    estradioL  1 mg Oral Daily    FLUoxetine  20 mg Oral Daily    fluticasone propionate  2 spray Each Nostril Daily    levothyroxine  125 mcg Oral Before breakfast    lurasidone  60 mg Oral Daily    methylphenidate HCl  30 mg Oral Daily    midodrine  10 mg Oral TID    pantoprazole  40 mg Oral BID    polyethylene glycol  17 g Oral BID    senna  8.6 mg Oral BID    sucralfate  1 g Oral QID (AC & HS)    tamsulosin  0.4 mg Oral Daily    topiramate  200 mg Oral BID     Continuous Infusions:      As Needed:    Current Facility-Administered Medications:     acetaminophen, 650 mg, Oral, Q6H PRN    albuterol, 2 puff, Inhalation, Q6H PRN    aluminum & magnesium hydroxide-simethicone, 30 mL, Oral, Q6H PRN    dextrose 10%, 12.5 g, Intravenous, PRN    dextrose 10%, 25 g, Intravenous, PRN    glucagon (human recombinant), 1 mg, Intramuscular, PRN    glucose, 24 g, Oral, PRN    HYDROmorphone, 0.5 mg, Intravenous, Q4H PRN    hydrOXYzine, 50 mg, Oral, TID PRN    insulin aspart U-100, 0-5 Units, Subcutaneous, QID (AC + HS) PRN    LORazepam, 2 mg, Oral, PRN    melatonin, 6 mg, Oral, Nightly PRN    naloxone, 0.02 mg, Intravenous, PRN    ondansetron, 4 mg, Intravenous, Q8H PRN    sodium chloride 0.9%, 10 mL, Intravenous, Q12H PRN    sumatriptan, 100 mg, Oral, Q12H PRN    Assessment and Plan  / Problems managed today    * Hypoglycemia  -likely due to combinations of malnutrition and Mounjaro   -Mounjaro should be discontinued  - will do cortisol stim test  - endocrinology consulted   will check serum insulin level, C-peptide, IGF, sulfonylurea screen for glc <50    Hypotension  -borderline low SBP  in a patient with h/o HTN treated with olmesartan   -recent BP log at home  showed SBP in low 100s  -she will likely no longer need BP medication given significant weight loss   -no signs of bleeding, overt infection or sepsis   -SBP remains 85-90 despite 2 L NS bolus   -will give another 500 cc NS bolus and check lactic acid (WNL), procalcitonin (WNL), cortisol (acceptable for AM levels), TSH/FT4 (WNL)  - not improving despite boluses  - likely due to malnutrition as stated above  - cortisol stim test to rule out adrenal insufficiency - endocrinology consulted  - due to orthostasis and two falls/near falls, will start midodrine 10 mg TID while here and undergoing work up  - PT/OT    Type 2 diabetes mellitus  Patient's FSGs are controlled on current medication regimen.  Last A1c reviewed-   Lab Results   Component Value Date    HGBA1C 4.7 12/07/2024     Most recent fingerstick glucose reviewed-   Recent Labs   Lab 12/08/24  0230 12/08/24  0432 12/08/24  0727 12/08/24  1122   POCTGLUCOSE 120* 92 88 84       Current correctional scale  Low  Maintain anti-hyperglycemic dose as follows-   Antihyperglycemics (From admission, onward)      Start     Stop Route Frequency Ordered    12/07/24 0129  insulin aspart U-100 pen 0-5 Units         -- SubQ Before meals & nightly PRN 12/07/24 0030          Hold Oral hypoglycemics while patient is in the hospital.  Stop mounjaro jeff to persisting hypoglycemia    Bipolar disorder  -no acute issue, lives at home in [facility] with a friend   -denies SI/HI  -continue home Vraylar and depakote       Chronic abdominal pain  -long standing on and off abdominal pain, N/V/D/constipation for last 2 years with 140 lbs weight loss   -likely from adverse reaction of Mounjaro vs gastritis of gastric remnant and/or funcitonal  -CT abdomen showed mild inflammatory changes of gastric remnant   -LFTs unremarkable   -mild TTP over RUQ and across lower abdomen on my exam w/o peritonitis   -will start on PPI   -consult GI for further evaluation and possible EGD in setting  of N/V with significant weight loss   - MRCP as recommended by Dr. Jack   Aborted MRCP when done due to anxiety   Repeat MRCP with ativan prn  - on hydromorphone but patient not eating - worsens GI motility and patient high risk for developing dependence  - stop maalox and dicylcomine as adding to polypharmacy and not working  - EGD showed esophagitis dessicans - protonix 40 mg BID and carafate 1 g QID   Likely not source of abdominal pain    Dehydration  -due to poor oral intake in setting of N/V  -received IVF   - resolved    Vitamin B12 deficiency (non anemic)  -on monthly vitamin B-12 injection      Attention deficit hyperactivity disorder (ADHD) with anxiety  -patient can take her home Vyvanse     Migraine without status migrainosus, not intractable  -no signs of acute flare up  -continue home topamax and imitrex prn       Anxiety  -no acute issue and will continue home Fluoxetine, hydroxyzine prn       Depression  Patient has recurrent depression which is moderate and is currently controlled. Will Continue anti-depressant medications. We will not consult psychiatry at this time. Patient does not display psychosis at this time. Continue to monitor closely and adjust plan of care as needed.  -continue home fluoxetine, Latuda and Trintellix         Hypothyroidism  -continue home synthroid   -check TSH/FT4        Diet:  liquid diet  GI PPx: not needed  DVT PPx:  enoxaparin  Airways: room air  Wounds: none    Goals of Care:  Return to prior functional status     Discharge Planning   JASPER: 12/11/2024   Is the patient medically ready for discharge?:     Reason for patient still in hospital (select all that apply): Patient trending condition and Treatment  Discharge Plan A: Home with family   Discharge Delays: None known at this time    Maryanne Camargo MD        ankle

## 2025-03-28 ENCOUNTER — RX RENEWAL (OUTPATIENT)
Age: 50
End: 2025-03-28